# Patient Record
Sex: FEMALE | Race: WHITE | NOT HISPANIC OR LATINO | Employment: UNEMPLOYED | URBAN - METROPOLITAN AREA
[De-identification: names, ages, dates, MRNs, and addresses within clinical notes are randomized per-mention and may not be internally consistent; named-entity substitution may affect disease eponyms.]

---
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
## 2025-06-02 ENCOUNTER — OFFICE VISIT (OUTPATIENT)
Dept: OBGYN CLINIC | Facility: CLINIC | Age: 20
End: 2025-06-02

## 2025-06-02 VITALS
DIASTOLIC BLOOD PRESSURE: 62 MMHG | BODY MASS INDEX: 21.9 KG/M2 | WEIGHT: 119 LBS | SYSTOLIC BLOOD PRESSURE: 104 MMHG | HEIGHT: 62 IN

## 2025-06-02 DIAGNOSIS — Z11.3 SCREENING FOR STD (SEXUALLY TRANSMITTED DISEASE): ICD-10-CM

## 2025-06-02 DIAGNOSIS — N76.0 BV (BACTERIAL VAGINOSIS): Primary | ICD-10-CM

## 2025-06-02 DIAGNOSIS — B96.89 BV (BACTERIAL VAGINOSIS): Primary | ICD-10-CM

## 2025-06-02 DIAGNOSIS — L29.2 VULVAR ITCHING: ICD-10-CM

## 2025-06-02 LAB
BV WHIFF TEST VAG QL: ABNORMAL
CLUE CELLS SPEC QL WET PREP: ABNORMAL
PH SMN: 5 [PH]
SL AMB POCT WET MOUNT: ABNORMAL
T VAGINALIS VAG QL WET PREP: ABNORMAL
YEAST VAG QL WET PREP: ABNORMAL

## 2025-06-02 RX ORDER — METRONIDAZOLE 500 MG/1
500 TABLET ORAL EVERY 12 HOURS SCHEDULED
Qty: 14 TABLET | Refills: 0 | Status: SHIPPED | OUTPATIENT
Start: 2025-06-02 | End: 2025-06-09

## 2025-06-02 RX ORDER — TRIAMCINOLONE ACETONIDE 0.25 MG/G
OINTMENT TOPICAL 2 TIMES DAILY
Qty: 15 G | Refills: 0 | Status: SHIPPED | OUTPATIENT
Start: 2025-06-02 | End: 2025-06-09

## 2025-06-02 NOTE — PATIENT INSTRUCTIONS
Rx sent to pharmacy on file. . Use medication as directed.   Call with any worsening of symptoms.   Wear cotton underwear, loose pants, skirts and/or dresses.   Avoid synthetic underwear.   Promptly remove a swimsuit or excise clothing once done with activity.   Bathe daily with dove bar soap. Rinse well and pat dry with a soft towel. Avoid scrubbing with a washcloth or loofah.  Avoid bubble baths.   Use unscented detergents.   Avoid use of baby or flushable wipes to vulva. Rinse with warm water.   Avoid feminine sprays and powders.   Limit the use of a michel pad.   Consider daily refrigerated probiotic.   Protect skin with coconut oil as needed.   Healthy diet and adequate hydration.   Bacterial vaginosis noted on wet mount.   Rx sent to requested pharmacy. No sex during treatment. Complete all medication as directed.   Consider daily probiotic.  Healthy diet recommended.   Call with any recurrence of symptoms.   Return to office for annual exam.

## 2025-06-02 NOTE — PROGRESS NOTES
Name: Sandra Guajardo      : 2005      MRN: [id number]  Encounter Provider: DONNA June  Encounter Date: 2025   Encounter department: [facility]  :  Assessment & Plan  Screening for STD (sexually transmitted disease)  Plans to complete at [facility] today.        BV (bacterial vaginosis)  Bacterial vaginosis noted on wet mount.   Rx sent to requested pharmacy. No sex during treatment. Complete all medication as directed.   Consider daily probiotic.  Healthy diet recommended.   Call with any recurrence of symptoms.     Orders:    POCT wet mount    metroNIDAZOLE (FLAGYL) 500 mg tablet; Take 1 tablet (500 mg total) by mouth every 12 (twelve) hours for 7 days    Vulvar itching  Rx sent to pharmacy on file. Use medication as directed.   Call with any worsening of symptoms.   Wear cotton underwear, loose pants, skirts and/or dresses.   Avoid synthetic underwear.   Promptly remove a swimsuit or excise clothing once done with activity.   Bathe daily with dove bar soap. Rinse well and pat dry with a soft towel. Avoid scrubbing with a washcloth or loofah.  Avoid bubble baths.   Use unscented detergents.   Avoid use of baby or flushable wipes to vulva. Rinse with warm water.   Avoid feminine sprays and powders.   Limit the use of a michel pad.   Consider daily refrigerated probiotic.   Protect skin with coconut oil as needed.   Healthy diet and adequate hydration.   Return to office for annual exam.  Orders:    triamcinolone (KENALOG) 0.025 % ointment; Apply topically 2 (two) times a day for 7 days        History of Present Illness   HPI  Sandra Guajardo is a 19 y.o. G0 female who is here today as a new patient for a problem visit/1st gyn visit.   C/o intermittent vulvar itching and burring x 1 year.   Scratching worsens the symptoms.   Aquaphor lessens the symptoms. She uses this sparingly because she didn't know if it was a good idea.   Bathes daily with  "a love wellness vaginal soap.     Monthly menses x 5 days with mod flow. Menses is acceptable.   Sandra Guajardo is sexually active with male partner of 2 years. Denies pain, bleeding and does have external vulvar dryness noted with sex. No lubricants used.   She is  monogamous and feels safe in this relationship. .   She uses condoms  for contraception.  Does not plan pregnancy in the next 12 months. Pregnancy would not be acceptable.   She is interested in STD screening today. Plans to go to [facility] today.   She denies vaginal discharge, itching, pelvic pain.   She has no  urinary concerns, does not have incontinence.  .     History obtained from: patient    Review of Systems   Constitutional: Negative.    Gastrointestinal:  Negative for abdominal pain, constipation, diarrhea, nausea and vomiting.   Genitourinary:  Negative for decreased urine volume, difficulty urinating, dyspareunia, dysuria, frequency, genital sores, menstrual problem, pelvic pain, urgency, vaginal bleeding, vaginal discharge and vaginal pain.        Vulvar burning and irritation   Musculoskeletal:  Negative for arthralgias and myalgias.   Skin: Negative.    Hematological:  Negative for adenopathy.   Psychiatric/Behavioral: Negative.     All other systems reviewed and are negative.    Medical History Reviewed by provider this encounter:  Tobacco  Allergies  Meds  Problems  Med Hx  Surg Hx  Fam Hx     .  Medications Ordered Prior to Encounter[1]   Social History[2]     Objective   /62 (BP Location: Left arm, Patient Position: Sitting, Cuff Size: Standard)   Ht 5' 2\" (1.575 m)   Wt 54 kg (119 lb)   LMP 05/11/2025 (Exact Date)   BMI 21.77 kg/m²      Physical Exam  Vitals and nursing note reviewed.   Constitutional:       Appearance: Normal appearance. She is well-developed.   Genitourinary:     Exam position: Lithotomy position.      Labia:         Right: Tenderness present. No rash, lesion or injury.         Left: Tenderness " present. No rash, lesion or injury.       Urethra: No prolapse, urethral pain, urethral swelling or urethral lesion.      Vagina: No signs of injury and foreign body. Vaginal discharge (thin homogenous white) present. No erythema, tenderness or bleeding.      Cervix: No cervical motion tenderness, discharge, friability, lesion, erythema or cervical bleeding.      Uterus: Normal.       Adnexa: Right adnexa normal and left adnexa normal.        Right: No mass, tenderness or fullness.          Left: No mass, tenderness or fullness.        Rectum: No external hemorrhoid.        Comments: Erythema with linear excoriation, likely from scratching.   Lymphadenopathy:      Lower Body: No right inguinal adenopathy. No left inguinal adenopathy.     Skin:     General: Skin is warm and dry.     Neurological:      Mental Status: She is alert and oriented to person, place, and time.     Psychiatric:         Mood and Affect: Mood normal.         Behavior: Behavior normal.                [1]   Current Outpatient Medications on File Prior to Visit   Medication Sig Dispense Refill    albuterol (ProAir HFA) 90 mcg/act inhaler Inhale 2 puffs every 6 (six) hours as needed for shortness of breath 8.5 g 0    [DISCONTINUED] aspirin (ECOTRIN LOW STRENGTH) 81 mg EC tablet Take 1 tablet (81 mg total) by mouth 2 (two) times a day (Patient not taking: Reported on 7/8/2021) 28 tablet 0    [DISCONTINUED] oxyCODONE-acetaminophen (PERCOCET) 5-325 mg per tablet Take 1 tablet by mouth every 4 (four) hours as needed for moderate painMax Daily Amount: 6 tablets (Patient not taking: Reported on 7/8/2021) 10 tablet 0     No current facility-administered medications on file prior to visit.   [2]   Social History  Tobacco Use    Smoking status: Never    Smokeless tobacco: Never   Substance and Sexual Activity    Alcohol use: Never    Drug use: Never    Sexual activity: Yes     Partners: Male     Birth control/protection: Condom Male

## 2025-06-11 ENCOUNTER — APPOINTMENT (EMERGENCY)
Dept: RADIOLOGY | Facility: HOSPITAL | Age: 20
End: 2025-06-11

## 2025-06-11 ENCOUNTER — HOSPITAL ENCOUNTER (EMERGENCY)
Facility: HOSPITAL | Age: 20
Discharge: HOME | End: 2025-06-11
Attending: EMERGENCY MEDICINE

## 2025-06-11 VITALS
RESPIRATION RATE: 20 BRPM | TEMPERATURE: 98.8 F | DIASTOLIC BLOOD PRESSURE: 72 MMHG | SYSTOLIC BLOOD PRESSURE: 130 MMHG | OXYGEN SATURATION: 100 % | HEART RATE: 102 BPM

## 2025-06-11 DIAGNOSIS — S90.129A TOE CONTUSION: Primary | ICD-10-CM

## 2025-06-11 PROCEDURE — 99283 EMERGENCY DEPT VISIT LOW MDM: CPT | Performed by: EMERGENCY MEDICINE

## 2025-06-11 PROCEDURE — 99283 EMERGENCY DEPT VISIT LOW MDM: CPT

## 2025-06-11 PROCEDURE — 73630 X-RAY EXAM OF FOOT: CPT

## 2025-06-11 RX ORDER — ACETAMINOPHEN 325 MG/1
650 TABLET ORAL ONCE
Status: COMPLETED | OUTPATIENT
Start: 2025-06-11 | End: 2025-06-11

## 2025-06-11 RX ADMIN — ACETAMINOPHEN 650 MG: 325 TABLET ORAL at 19:24

## 2025-06-11 NOTE — ED PROVIDER NOTES
"Time reflects when diagnosis was documented in both MDM as applicable and the Disposition within this note       Time User Action Codes Description Comment    6/11/2025  7:47 PM Nany Parnell Add [S90.129A] Toe contusion           ED Disposition       ED Disposition   Discharge    Condition   Stable    Date/Time   Wed Jun 11, 2025  7:47 PM    Comment   Sandra Gallegospedes discharge to home/self care.                   Assessment & Plan       Medical Decision Making  Patient is a pleasant 19 yoF who complains of R foot and toe pain,. States a lift was brought down on her foot while picking up her car. She has pain localizing to the right second toe. Gel nail polish limits nail eval however no evidence of nail loss. No fracture identified on XR.   D    Amount and/or Complexity of Data Reviewed  Radiology: independent interpretation performed.     Details: No acute fracture identified    Risk  OTC drugs.             Medications   acetaminophen (TYLENOL) tablet 650 mg (650 mg Oral Given 6/11/25 1924)       ED Risk Strat Scores              CRAFFT      Flowsheet Row Most Recent Value   CRAFFT Initial Screen: During the past 12 months, did you:    1. Drink any alcohol (more than a few sips)?  No Filed at: 06/11/2025 1750   2. Smoke any marijuana or hashish No Filed at: 06/11/2025 1750   3. Use anything else to get high? (\"anything else\" includes illegal drugs, over the counter and prescription drugs, and things that you sniff or 'james')? No Filed at: 06/11/2025 1750              No data recorded                            History of Present Illness       Chief Complaint   Patient presents with    Foot Pain     States she was at [employer] and was asked to unlock the car, states the abe lowered onto her R foot, patient ambulatory to triage.          Past Medical History[1]   Past Surgical History[2]   Family History[3]   Social History[4]   E-Cigarette/Vaping      E-Cigarette/Vaping Substances      I have reviewed " and agree with the history as documented.     Patient presents with toe injury          Review of Systems   Musculoskeletal:         Toe pain           Objective       ED Triage Vitals   Temperature Pulse Blood Pressure Respirations SpO2 Patient Position - Orthostatic VS   06/11/25 1748 06/11/25 1748 06/11/25 1748 06/11/25 1748 06/11/25 1748 06/11/25 1748   98.8 °F (37.1 °C) 102 130/72 20 100 % Sitting      Temp Source Heart Rate Source BP Location FiO2 (%) Pain Score    06/11/25 1748 06/11/25 1748 06/11/25 1748 -- 06/11/25 1924    Temporal Monitor Left arm  7      Vitals      Date and Time Temp Pulse SpO2 Resp BP Pain Score FACES Pain Rating User   06/11/25 1924 -- -- -- -- -- 7 -- BH   06/11/25 1748 98.8 °F (37.1 °C) 102 100 % 20 130/72 -- -- RJ            Physical Exam  Vitals and nursing note reviewed.   Constitutional:       General: She is not in acute distress.     Appearance: Normal appearance.   HENT:      Head: Normocephalic and atraumatic.      Right Ear: External ear normal.      Left Ear: External ear normal.      Nose: Nose normal.     Cardiovascular:      Rate and Rhythm: Normal rate.      Pulses: Normal pulses.   Pulmonary:      Effort: Pulmonary effort is normal. No respiratory distress.     Musculoskeletal:         General: Tenderness (R second toe, no gross deformity,) present.      Right lower leg: No edema.      Left lower leg: No edema.     Skin:     General: Skin is warm and dry.      Findings: No bruising.     Neurological:      General: No focal deficit present.      Mental Status: She is alert and oriented to person, place, and time. Mental status is at baseline.         Results Reviewed       None            XR foot 3+ views RIGHT    (Results Pending)       Procedures    ED Medication and Procedure Management   Prior to Admission Medications   Prescriptions Last Dose Informant Patient Reported? Taking?   albuterol (ProAir HFA) 90 mcg/act inhaler   No No   Sig: Inhale 2 puffs every 6 (six)  hours as needed for shortness of breath   triamcinolone (KENALOG) 0.025 % ointment   No No   Sig: Apply topically 2 (two) times a day for 7 days      Facility-Administered Medications: None     Patient's Medications   Discharge Prescriptions    No medications on file     No discharge procedures on file.  ED SEPSIS DOCUMENTATION   Time reflects when diagnosis was documented in both MDM as applicable and the Disposition within this note       Time User Action Codes Description Comment    6/11/2025  7:47 PM Nany Parnell Add [S90.129A] Toe contusion                      [1] No past medical history on file.  [2]   Past Surgical History:  Procedure Laterality Date    ARTHROSCOPY KNEE Left 6/23/2021    Procedure: ARTHROSCOPY KNEE, loose body removal, BEN Biopsy;  Surgeon: Jomar Gutierrez DO;  Location: [facility];  Service: Orthopedics    RI RCNSTJ DISLC PATELLA W/XTNSR RELIGNMT&/MUSC RL Left 6/23/2021    Procedure: REALIGNMENT PATELLA / MPFL Reconstruction;  Surgeon: Jomar Gutierrez DO;  Location: [facility];  Service: Orthopedics   [3]   Family History  Problem Relation Name Age of Onset    Cirrhosis Mother      No Known Problems Father      No Known Problems Brother      No Known Problems Brother      No Known Problems Brother     [4]   Social History  Tobacco Use    Smoking status: Never    Smokeless tobacco: Never   Substance Use Topics    Alcohol use: Never    Drug use: Never        Nany Parnell MD  06/11/25 1949

## 2025-06-11 NOTE — DISCHARGE INSTRUCTIONS
You were seen and evaluated today for foot injury.  Your test results demonstrated normal XR  Please take Motrin as needed for pain.  Keep foot elevated.  Continue to use ice.  Follow up with your PCP as discussed.   RETURN TO THE EMERGENCY DEPARTMENT if you develop new or worsening symptoms and are unable to see your PCP.

## (undated) DEVICE — BLADE SAGITTAL 25.6 X 9.5MM

## (undated) DEVICE — 3M™ STERI-STRIP™ REINFORCED ADHESIVE SKIN CLOSURES, R1547, 1/2 IN X 4 IN (12 MM X 100 MM), 6 STRIPS/ENVELOPE: Brand: 3M™ STERI-STRIP™

## (undated) DEVICE — SUT ETHILON 3-0 PS-1 18 IN 1663G

## (undated) DEVICE — DRAPE EQUIPMENT RF WAND

## (undated) DEVICE — INTENDED FOR TISSUE SEPARATION, AND OTHER PROCEDURES THAT REQUIRE A SHARP SURGICAL BLADE TO PUNCTURE OR CUT.: Brand: BARD-PARKER ® CARBON RIB-BACK BLADES

## (undated) DEVICE — SUT VICRYL PLUS 1 CTB-1 36 IN VCPB947H

## (undated) DEVICE — PAD GROUNDING ADULT

## (undated) DEVICE — 4-PORT MANIFOLD: Brand: NEPTUNE 2

## (undated) DEVICE — ACE WRAP 4 IN UNSTERILE

## (undated) DEVICE — TUBING ARTHROSCOPIC WAVE  MAIN PUMP

## (undated) DEVICE — NEEDLE 25GA X 1 IN SAFETY GLIDE

## (undated) DEVICE — LIGHT GLOVE GREEN

## (undated) DEVICE — SUT MONOCRYL 4-0 PS-2 18 IN Y496G

## (undated) DEVICE — WEBRIL 6 IN UNSTERILE

## (undated) DEVICE — SUT VICRYL 0 CT-2 18 IN J727D

## (undated) DEVICE — CHLORAPREP HI-LITE 26ML ORANGE

## (undated) DEVICE — GLOVE INDICATOR PI UNDERGLOVE SZ 7.5 BLUE

## (undated) DEVICE — UNIVERSAL MAJOR EXTREMITY,KIT: Brand: CARDINAL HEALTH

## (undated) DEVICE — SPONGE LAP 18 X 18 IN

## (undated) DEVICE — LIGHT HANDLE COVER SLEEVE DISP BLUE STELLAR

## (undated) DEVICE — SUT VICRYL 2-0 CT-2 18 IN J726D

## (undated) DEVICE — INTENDED FOR TISSUE SEPARATION, AND OTHER PROCEDURES THAT REQUIRE A SHARP SURGICAL BLADE TO PUNCTURE OR CUT.: Brand: BARD-PARKER SAFETY BLADES SIZE 10, STERILE

## (undated) DEVICE — COBAN 4 IN STERILE

## (undated) DEVICE — DRAPE SHEET THREE QUARTER

## (undated) DEVICE — PADDING CAST 4 IN  COTTON STRL

## (undated) DEVICE — GLOVE SRG BIOGEL 7.5

## (undated) DEVICE — 2108 SERIES SAGITTAL BLADE (18.6 X 0.64 X 61.1MM)

## (undated) DEVICE — GAUZE SPONGES,16 PLY: Brand: CURITY

## (undated) DEVICE — INTENDED FOR TISSUE SEPARATION, AND OTHER PROCEDURES THAT REQUIRE A SHARP SURGICAL BLADE TO PUNCTURE OR CUT.: Brand: BARD-PARKER SAFETY BLADES SIZE 15, STERILE

## (undated) DEVICE — ACE WRAP 6 IN UNSTERILE

## (undated) DEVICE — TUBING SUCTION 5MM X 12 FT

## (undated) DEVICE — ABDOMINAL PAD: Brand: DERMACEA

## (undated) DEVICE — BETHLEHEM UNIVERSAL  ARTHRO PK: Brand: CARDINAL HEALTH

## (undated) DEVICE — 3M™ STERI-DRAPE™ U-DRAPE 1015: Brand: STERI-DRAPE™

## (undated) DEVICE — DRAPE C-ARM X-RAY

## (undated) DEVICE — PLUMEPEN PRO 10FT

## (undated) DEVICE — BLADE SHAVER DISSECTOR 3.5MM 13CM COOLCUT

## (undated) DEVICE — GLOVE SRG BIOGEL ORTHOPEDIC 7.5

## (undated) DEVICE — SUT VICRYL PLUS 2-0 CTB-1 27 IN VCPB259H

## (undated) DEVICE — SCD SEQUENTIAL COMPRESSION COMFORT SLEEVE MEDIUM KNEE LENGTH: Brand: KENDALL SCD

## (undated) DEVICE — DRESSING XEROFORM 5 X 9

## (undated) DEVICE — IMPERVIOUS STOCKINETTE: Brand: DEROYAL